# Patient Record
Sex: FEMALE | Race: WHITE
[De-identification: names, ages, dates, MRNs, and addresses within clinical notes are randomized per-mention and may not be internally consistent; named-entity substitution may affect disease eponyms.]

---

## 2020-12-14 ENCOUNTER — HOSPITAL ENCOUNTER (INPATIENT)
Dept: HOSPITAL 47 - 2ORMAIN | Age: 65
LOS: 8 days | Discharge: HOME | DRG: 331 | End: 2020-12-22
Attending: SURGERY | Admitting: SURGERY
Payer: COMMERCIAL

## 2020-12-14 VITALS — BODY MASS INDEX: 33.4 KG/M2

## 2020-12-14 DIAGNOSIS — K57.30: ICD-10-CM

## 2020-12-14 DIAGNOSIS — Z80.1: ICD-10-CM

## 2020-12-14 DIAGNOSIS — Z79.890: ICD-10-CM

## 2020-12-14 DIAGNOSIS — Z82.49: ICD-10-CM

## 2020-12-14 DIAGNOSIS — E89.0: ICD-10-CM

## 2020-12-14 DIAGNOSIS — R31.9: ICD-10-CM

## 2020-12-14 DIAGNOSIS — Z90.49: ICD-10-CM

## 2020-12-14 DIAGNOSIS — K56.699: Primary | ICD-10-CM

## 2020-12-14 DIAGNOSIS — Z79.899: ICD-10-CM

## 2020-12-14 DIAGNOSIS — J45.909: ICD-10-CM

## 2020-12-14 DIAGNOSIS — Z87.891: ICD-10-CM

## 2020-12-14 DIAGNOSIS — G43.909: ICD-10-CM

## 2020-12-14 PROCEDURE — 88307 TISSUE EXAM BY PATHOLOGIST: CPT

## 2020-12-14 PROCEDURE — 86901 BLOOD TYPING SEROLOGIC RH(D): CPT

## 2020-12-14 PROCEDURE — 80048 BASIC METABOLIC PNL TOTAL CA: CPT

## 2020-12-14 PROCEDURE — 80053 COMPREHEN METABOLIC PANEL: CPT

## 2020-12-14 PROCEDURE — 85025 COMPLETE CBC W/AUTO DIFF WBC: CPT

## 2020-12-14 PROCEDURE — 86850 RBC ANTIBODY SCREEN: CPT

## 2020-12-14 PROCEDURE — 80051 ELECTROLYTE PANEL: CPT

## 2020-12-14 PROCEDURE — 85027 COMPLETE CBC AUTOMATED: CPT

## 2020-12-14 PROCEDURE — 86900 BLOOD TYPING SEROLOGIC ABO: CPT

## 2020-12-14 PROCEDURE — 94640 AIRWAY INHALATION TREATMENT: CPT

## 2020-12-18 LAB
ANION GAP SERPL CALC-SCNC: 5 MMOL/L
CHLORIDE SERPL-SCNC: 104 MMOL/L (ref 98–107)
CO2 SERPL-SCNC: 28 MMOL/L (ref 22–30)
ERYTHROCYTE [DISTWIDTH] IN BLOOD BY AUTOMATED COUNT: 3.98 M/UL (ref 3.8–5.4)
ERYTHROCYTE [DISTWIDTH] IN BLOOD: 12.7 % (ref 11.5–15.5)
HCT VFR BLD AUTO: 36.8 % (ref 34–46)
HGB BLD-MCNC: 12.3 GM/DL (ref 11.4–16)
MCH RBC QN AUTO: 30.8 PG (ref 25–35)
MCHC RBC AUTO-ENTMCNC: 33.4 G/DL (ref 31–37)
MCV RBC AUTO: 92.3 FL (ref 80–100)
PLATELET # BLD AUTO: 348 K/UL (ref 150–450)
POTASSIUM SERPL-SCNC: 3.9 MMOL/L (ref 3.5–5.1)
SODIUM SERPL-SCNC: 137 MMOL/L (ref 137–145)
WBC # BLD AUTO: 9.6 K/UL (ref 3.8–10.6)

## 2020-12-18 PROCEDURE — 0DJD4ZZ INSPECTION OF LOWER INTESTINAL TRACT, PERCUTANEOUS ENDOSCOPIC APPROACH: ICD-10-PCS

## 2020-12-18 PROCEDURE — 0T778DZ DILATION OF LEFT URETER WITH INTRALUMINAL DEVICE, VIA NATURAL OR ARTIFICIAL OPENING ENDOSCOPIC: ICD-10-PCS

## 2020-12-18 PROCEDURE — 0DBN0ZZ EXCISION OF SIGMOID COLON, OPEN APPROACH: ICD-10-PCS

## 2020-12-18 PROCEDURE — 8E0W4CZ ROBOTIC ASSISTED PROCEDURE OF TRUNK REGION, PERCUTANEOUS ENDOSCOPIC APPROACH: ICD-10-PCS

## 2020-12-18 RX ADMIN — METRONIDAZOLE SCH MLS/HR: 500 INJECTION, SOLUTION INTRAVENOUS at 22:24

## 2020-12-18 RX ADMIN — POTASSIUM CHLORIDE SCH MLS: 14.9 INJECTION, SOLUTION INTRAVENOUS at 13:30

## 2020-12-18 RX ADMIN — HYDROMORPHONE HYDROCHLORIDE PRN MG: 1 INJECTION, SOLUTION INTRAMUSCULAR; INTRAVENOUS; SUBCUTANEOUS at 23:27

## 2020-12-18 RX ADMIN — DEXTROSE MONOHYDRATE, SODIUM CHLORIDE, AND POTASSIUM CHLORIDE SCH MLS/HR: 50; 4.5; 1.49 INJECTION, SOLUTION INTRAVENOUS at 22:24

## 2020-12-18 RX ADMIN — HEPARIN SODIUM SCH UNIT: 5000 INJECTION, SOLUTION INTRAVENOUS; SUBCUTANEOUS at 23:37

## 2020-12-18 NOTE — P.OP
Date of Procedure: 12/18/20


Preoperative Diagnosis: 


Sigmoid diverticular stricture


Postoperative Diagnosis: 


Same


Procedure(s) Performed: 


Cystoscopy with placement of 6-Swedish ureteral catheter left


Anesthesia: ROSE


Surgeon: Emir Nolen


Pathology: none sent


Condition: stable


Disposition: PACU


Indications for Procedure: 


The patient is 65.  She is in the operating suite see evening because of the 

need for a colectomy for severe sigmoid colonic diverticular stricture by Dr. Akins.  Due to the severe stricture Dr. Akins is uncertain as to the exact 

location of the ureter does not wish to injure it.  I've been asked to see the 

patient.  There is no urologic history that I can obtain from the chart.  The 

patient is asleep on the table.


Description of Procedure: 


The patient is been previously anesthetized is on the operating table.  When I 

arrived she is oriented in lithotomy position with cystoscopy.  I pulled the 

Ospina catheter.  I passed the 21-Swedish cystoscope into the urethra is normal.  

The bladder walls grossly unremarkable.  The left ureteral orifice is easily 

identified.  An 035 wires passed up the ureter into the region of the kidney.  

Dr. Akins feels the wire go through the ureter.  I then over the wire pass a 6-

Swedish open-ended ureteral catheter with ease up.  Dr. Akins feels the catheter 

go up the ureter.  The cystoscope was removed.  The ureteral catheter secured to

the 16-Swedish Ospina catheter placed in the bladder.





Impression:no obvious injury of the ureter or bladder during this difficult 

surgical procedure.  The ureteral catheter can be removed when it is deemed 

appropriate by Dr. Akins.  No further urologic care should be required.

## 2020-12-18 NOTE — P.OP
Date of Procedure: 12/18/20


Preoperative Diagnosis: 


Diverticular stricture


Postoperative Diagnosis: 


Diverticular stricture with dense pelvic adhesions


Procedure(s) Performed: 


Attempted robotic sigmoid resection converted to open sigmoid resection


Anesthesia: ROSE


Surgeon: Brittney Akins


Estimated Blood Loss (ml): 600


Pathology: other


Condition: stable


Disposition: PACU


Indications for Procedure: 


The patient presented with a diverticular stricture


Description of Procedure: 


The patient's taken to the operative suite where she is prepped and draped in 

the usual sterile manner under general endotracheal anesthetic.  The abdomen was

entered through with an optical trocar in the right mid abdomen.  

Pneumoperitoneum was established with CO2 gas.  Sites are chosen for accessory 

trochars and these are laced through small skin incisions.  The patient is 

placed into Trendelenburg position.  The robot is then docked.  She has some 

loops of small bowel adhered to the bladder and uterus.  These are sharply taken

down.  The sigmoid colon is also densely adherent to the uterus and right tube 

and ovary.  This tissue was attempted to be freed up.  It was fairly dense a 

decision was made to start in the sigmoid colon more proximally and worked 

distally.  An opening was made in the mesentery in the APPLE stapler was placed 

and fired.  The mesentery was taken down with harmonic scissors.  Attempted to 

dissect the sigmoid off the tube and ovary and bleeding was encountered in the 

mesentery of the tube.  It could not easily be controlled with the LigaSure type

device.  The sigmoid colon was also so stuck it wasn't going to be able to be 

dissected free robotically.  Therefore she was undocked from the robot.  The 

abdomen was entered through a Pfannenstiel incision.  A Bookwalter retractor was

placed.  The small bowel was dissected free and packed out of the way in the 

upper abdomen.  Then tediously the sigmoid colon is dissected free from the 

uterus, tube and ovary and pelvic sidewall.  Dissection was carried out through 

the more distal portion of the sigmoid and off the back wall of the uterus.  

Dissection was carried out laterally.  The ureter could not be definitively 

identified.  Tediously the mesentery was dissected free.  The sigmoidal vessels 

were clamped, cut and tied with 0 Vicryl suture.  Dissection was then able to be

carried out distally and a TA 60 was placed and fired.  The specimen was passed 

off.  The urologist, Dr. Nolen was consult intraoperatively and was kind enough 

to come in and place a stent in the right ureter.  The ureter appeared intact 

and the stent was easily palpable.  The proximal descending colon was then 

cleaned up of some mesentery and epiploic fat.  A pursestring suture was placed 

and fired.  The bowel size down nicely to 60.  The anvil was placed into the 

proximal portion and the pursestring was tied down.  Cystoscopy went down below 

and passed sizers and then the stapler.  The attachment prong was advanced until

the orange portion was seen.  It was then attached to the anvil and closed down 

until the indicator was in the mid green portion.  It was held for a minute.  

Fired and held for minute.  It was then derotated and removed.  Sterile saline 

was placed into the pelvis.  The proximal colon was manually compressed and the 

distal segment of the bowel was insufflated with air multiple times showing an 

airtight anastomosis.  The irrigant was suctioned out.  A drain was placed in 

the pelvis and brought out through a trocar site.  The fascia at the larger 

trocar sites was closed with 0 Vicryl.  The skin incisions were closed with 

staples.  The peritoneum was closed with 0 Vicryl.  The anterior fascia was 

closed with PDS.  Pfannenstiel incision was closed with staples.  Sterile 

dressings were applied.  She tolerated the procedure without difficulty and was 

taken recovery room in satisfactory condition.  According to or personnel, all 

counts were correct.

## 2020-12-19 LAB
ANION GAP SERPL CALC-SCNC: 6.9 MMOL/L (ref 4–12)
BASOPHILS # BLD AUTO: 0 K/UL (ref 0–0.2)
BASOPHILS NFR BLD AUTO: 0 %
BUN SERPL-SCNC: 12 MG/DL (ref 9–27)
BUN/CREAT SERPL: 15 RATIO (ref 12–20)
CALCIUM SPEC-MCNC: 8.4 MG/DL (ref 8.7–10.3)
CHLORIDE SERPL-SCNC: 105 MMOL/L (ref 96–109)
CO2 SERPL-SCNC: 26.1 MMOL/L (ref 21.6–31.8)
EOSINOPHIL # BLD AUTO: 0.1 K/UL (ref 0–0.7)
EOSINOPHIL NFR BLD AUTO: 0 %
ERYTHROCYTE [DISTWIDTH] IN BLOOD BY AUTOMATED COUNT: 3.23 M/UL (ref 3.8–5.4)
ERYTHROCYTE [DISTWIDTH] IN BLOOD: 12.3 % (ref 11.5–15.5)
GLUCOSE SERPL-MCNC: 134 MG/DL (ref 70–110)
HCT VFR BLD AUTO: 29.7 % (ref 34–46)
HGB BLD-MCNC: 10.1 GM/DL (ref 11.4–16)
LYMPHOCYTES # SPEC AUTO: 1 K/UL (ref 1–4.8)
LYMPHOCYTES NFR SPEC AUTO: 7 %
MCH RBC QN AUTO: 31.1 PG (ref 25–35)
MCHC RBC AUTO-ENTMCNC: 33.9 G/DL (ref 31–37)
MCV RBC AUTO: 91.7 FL (ref 80–100)
MONOCYTES # BLD AUTO: 0.6 K/UL (ref 0–1)
MONOCYTES NFR BLD AUTO: 4 %
NEUTROPHILS # BLD AUTO: 11.6 K/UL (ref 1.3–7.7)
NEUTROPHILS NFR BLD AUTO: 87 %
PLATELET # BLD AUTO: 305 K/UL (ref 150–450)
POTASSIUM SERPL-SCNC: 4.9 MMOL/L (ref 3.5–5.5)
SODIUM SERPL-SCNC: 138 MMOL/L (ref 135–145)
WBC # BLD AUTO: 13.3 K/UL (ref 3.8–10.6)

## 2020-12-19 RX ADMIN — KETOROLAC TROMETHAMINE SCH MG: 15 INJECTION, SOLUTION INTRAMUSCULAR; INTRAVENOUS at 16:58

## 2020-12-19 RX ADMIN — FLUTICASONE PROPIONATE SCH PUFF: 110 AEROSOL, METERED RESPIRATORY (INHALATION) at 21:10

## 2020-12-19 RX ADMIN — HEPARIN SODIUM SCH UNIT: 5000 INJECTION, SOLUTION INTRAVENOUS; SUBCUTANEOUS at 07:35

## 2020-12-19 RX ADMIN — POTASSIUM CHLORIDE SCH: 14.9 INJECTION, SOLUTION INTRAVENOUS at 05:06

## 2020-12-19 RX ADMIN — DEXTROSE MONOHYDRATE, SODIUM CHLORIDE, AND POTASSIUM CHLORIDE SCH MLS/HR: 50; 4.5; 1.49 INJECTION, SOLUTION INTRAVENOUS at 07:35

## 2020-12-19 RX ADMIN — PANTOPRAZOLE SODIUM SCH MG: 40 INJECTION, POWDER, FOR SOLUTION INTRAVENOUS at 07:35

## 2020-12-19 RX ADMIN — HEPARIN SODIUM SCH UNIT: 5000 INJECTION, SOLUTION INTRAVENOUS; SUBCUTANEOUS at 23:52

## 2020-12-19 RX ADMIN — ONDANSETRON PRN MG: 2 INJECTION INTRAMUSCULAR; INTRAVENOUS at 14:33

## 2020-12-19 RX ADMIN — DEXTROSE MONOHYDRATE, SODIUM CHLORIDE, AND POTASSIUM CHLORIDE SCH MLS/HR: 50; 4.5; 1.49 INJECTION, SOLUTION INTRAVENOUS at 21:08

## 2020-12-19 RX ADMIN — METOCLOPRAMIDE SCH MG: 5 INJECTION, SOLUTION INTRAMUSCULAR; INTRAVENOUS at 23:52

## 2020-12-19 RX ADMIN — HYDROMORPHONE HYDROCHLORIDE PRN MG: 1 INJECTION, SOLUTION INTRAMUSCULAR; INTRAVENOUS; SUBCUTANEOUS at 14:33

## 2020-12-19 RX ADMIN — ONDANSETRON PRN MG: 2 INJECTION INTRAMUSCULAR; INTRAVENOUS at 07:53

## 2020-12-19 RX ADMIN — KETOROLAC TROMETHAMINE SCH MG: 15 INJECTION, SOLUTION INTRAMUSCULAR; INTRAVENOUS at 23:52

## 2020-12-19 RX ADMIN — METOCLOPRAMIDE SCH MG: 5 INJECTION, SOLUTION INTRAMUSCULAR; INTRAVENOUS at 16:58

## 2020-12-19 RX ADMIN — METRONIDAZOLE SCH MLS/HR: 500 INJECTION, SOLUTION INTRAVENOUS at 05:11

## 2020-12-19 RX ADMIN — HEPARIN SODIUM SCH UNIT: 5000 INJECTION, SOLUTION INTRAVENOUS; SUBCUTANEOUS at 16:59

## 2020-12-19 RX ADMIN — HYDROMORPHONE HYDROCHLORIDE PRN MG: 1 INJECTION, SOLUTION INTRAMUSCULAR; INTRAVENOUS; SUBCUTANEOUS at 06:51

## 2020-12-19 NOTE — P.PN
Subjective


Progress Note Date: 12/19/20


The patient is Postoperative day 1 sigmoid resection for diverticular stricture.

 She had a good night.  She has a little nausea this morning.  No chest pain or 

shortness of breath.








Objective





- Vital Signs


Vital signs: 


                                   Vital Signs











Temp  99.0 F   12/19/20 08:00


 


Pulse  86   12/19/20 08:00


 


Resp  16   12/19/20 08:00


 


BP  130/80   12/19/20 08:00


 


Pulse Ox  90 L  12/19/20 08:00








                                 Intake & Output











 12/18/20 12/19/20 12/19/20





 18:59 06:59 18:59


 


Intake Total 2950 800 


 


Output Total 700 710 


 


Balance 2250 90 


 


Weight 77 kg 77 kg 


 


Intake:   


 


  IV 2950 700 


 


  Oral  100 


 


Output:   


 


  Drainage  60 


 


    Abdomen  60 


 


  Urine 200 650 


 


    Uretheral (Ospina)  200 


 


  Estimated Blood Loss 500  


 


Other:   


 


  Voiding Method  Indwelling Catheter 














- Constitutional


General appearance: Present: cooperative, no acute distress





- Respiratory


Respiratory: bilateral: CTA, diminished (mildly bilaterally), negative: wheezing





- Cardiovascular


Rhythm: regular





- Gastrointestinal


General gastrointestinal: Present: decreased bowel sounds, soft


Localized gastrointestinal: surgical scar: diffuse (Dressings are intact, clean 

and dry)





- Labs


CBC & Chem 7: 


                                 12/19/20 06:26





                                 12/19/20 06:26


Labs: 


                  Abnormal Lab Results - Last 24 Hours (Table)











  12/19/20 12/19/20 Range/Units





  06:26 06:26 


 


WBC  13.3 H   (3.8-10.6)  k/uL


 


RBC  3.23 L   (3.80-5.40)  m/uL


 


Hgb  10.1 L   (11.4-16.0)  gm/dL


 


Hct  29.7 L   (34.0-46.0)  %


 


Neutrophils #  11.6 H   (1.3-7.7)  k/uL


 


Glucose   134 H  ()  mg/dL


 


Calcium   8.4 L  (8.7-10.3)  mg/dL














Assessment and Plan


(1) Diverticular stricture


Current Visit: Yes   Status: Acute   Code(s): K56.699 - OTHER INTESTNL OBST UNSP

AS TO PARTIAL VERSUS COMPLETE OBST   SNOMED Code(s): 96117097


   





(2) Asthma


Current Visit: Yes   Status: Acute   Code(s): J45.909 - UNSPECIFIED ASTHMA, 

UNCOMPLICATED   SNOMED Code(s): 998603502


   


Plan: 


Diverticular stricture we will add Toradol for additional pain control.  Add 

Reglan for some nausea.  Bowel sounds are fairly good today.  Encourage 

incentive spirometry.  Encourage ambulation.  DVT and ulcer prophylaxis.  

Progressing slowly.  I will leave the catheter in today since there is little 

blood in the urine.

## 2020-12-19 NOTE — P.CONS
History of Present Illness





- Reason for Consult


Consult date: 12/19/20


Medical management


Requesting physician: Brittney Akins





- Chief Complaint


Sigmoid resection for diverticular structure, asthma, hypothyroidism and re





- History of Present Illness





64-year-old female with past medical history of asthma, hypothyroidism, migraine

and recurrent diverticulitis who developed to have significant pain and 

discomfort with sign and symptom of diverticulitis was treated and watch she was

seen Dr. Prince back in August colonoscopy was done apparently showed severe 

diverticulitis patient ended up being sent for CT of the abdomen and pelvis came

back with abscess from ruptured diverticuli.  Patient was sent to the point to 

[facility] and spent 5 days on IV antibiotics and sent home on oral 

antibiotic for long time.  Patient was seen Dr. Prince for follow-up for to Dr. Akins continue to have sign and symptom of slight abdominal discomfort the left 

side patient ended up coming to yesterday for elective surgery which might start

as a robotic for partial resection of the sigmoid ended up having open resection

of the sigmoid with partial removal of diverticular structure.  Patient done 

very well in recovery ended up being sent to the floor has been feeling much 

better today still nothing by mouth but no NG tube at this point.





Review of Systems





CONSTITUTIONAL: Well-developed no acute respiratory distress.


EYES: No icterus sclerae, no conjunctivitis.


EARS, NOSE, MOUTH, THROAT, and FACE: No sore throat, lymphadenopathy, carotid 

bruits or deformity.


RESPIRATORY: No SOB cough or wheezes.  History of asthma with no wheezes


CARDIOVASCULAR: No CP, Palpitation, PND, Orthopnea, or angina.


GASTROINTESTINAL: Significant abdominal pain from her surgical side still have 

drainage tube in with no bleeding.


GENITOURINARY: Negative for Hematuria or UTI, no kidney stones.


INTEGUMENT/BREAST: Negative for any muscular injury with mild osteoarthritis..


HEMATOLOGIC/LYMPHATIC: Negative for bleed or purpura.


MUSCULOSKELTAL: Negative for Myalgia or arthralgia.


NEURLOGICAL: History of migraine with no neural loss.


BEHAVIORAL/PSYCH: Negative.


ENDOCRINE: Negative.





Past Medical History


Past Medical History: Asthma, Thyroid Disorder


Additional Past Medical History / Comment(s): migranes       diverticulitis


History of Any Multi-Drug Resistant Organisms: None Reported


Past Surgical History: Appendectomy


Additional Past Surgical History / Comment(s): thyroid left hemisphere removed, 

colonoscopy


Past Anesthesia/Blood Transfusion Reactions: Postoperative Nausea & Vomiting 

(PONV)


Past Psychological History: No Psychological Hx Reported


Smoking Status: Former smoker


Past Alcohol Use History: Occasional


Past Drug Use History: None Reported





- Past Family History


  ** Father


Family Medical History: Cancer


Additional Family Medical History / Comment(s): lung





Medications and Allergies


                                Home Medications











 Medication  Instructions  Recorded  Confirmed  Type


 


Levothyroxine Sodium [Synthroid] 25 mcg PO DAILY 12/16/20 12/16/20 History


 


Mometasone Inhalr 220 Mcg/Puff 1 puff INHALATION DAILY 12/16/20 12/16/20 History





[Asmanex]    


 


SUMAtriptan SUCCINATE [Imitrex] 100 mg PO BID PRN 12/16/20 12/16/20 History


 


Salmeterol 50 mcg [Serevent Diskus] 1 puff INHALATION ONCE 12/16/20 12/16/20 

History


 


Albuterol Sulfate [Proair Hfa] PRN 12/18/20  History








                                    Allergies











Allergy/AdvReac Type Severity Reaction Status Date / Time


 


No Known Allergies Allergy   Verified 12/18/20 13:36














Physical Exam


Vitals: 


                                   Vital Signs











  Temp Pulse Pulse Pulse Resp BP BP


 


 12/19/20 08:00  99.0 F  86    16   130/80


 


 12/19/20 02:00  98.4 F  92    20   116/76


 


 12/18/20 21:58     85   110/73 


 


 12/18/20 21:43     90   106/74 


 


 12/18/20 21:28     93   112/77 


 


 12/18/20 21:13     85   109/74 


 


 12/18/20 20:58     88   109/74 


 


 12/18/20 20:43     86   102/70 


 


 12/18/20 20:29     75   114/65 


 


 12/18/20 20:13     91   109/77 


 


 12/18/20 19:58     82   108/73 


 


 12/18/20 19:43  97.7 F    72  15  121/71 


 


 12/18/20 19:30      16  


 


 12/18/20 19:00     76  16  123/64 


 


 12/18/20 18:45     80  16  146/74 


 


 12/18/20 18:30     60  16  128/62 


 


 12/18/20 18:15     69  16  136/88 


 


 12/18/20 18:04  97.9 F    62  14  154/64 


 


 12/18/20 13:28  97.8 F   83   16  168/74 














  Pulse Ox


 


 12/19/20 08:00  90 L


 


 12/19/20 02:00  93 L


 


 12/18/20 21:58  93 L


 


 12/18/20 21:43  92 L


 


 12/18/20 21:28  91 L


 


 12/18/20 21:13  91 L


 


 12/18/20 20:58  91 L


 


 12/18/20 20:43  94 L


 


 12/18/20 20:29  94 L


 


 12/18/20 20:13  90 L


 


 12/18/20 19:58 


 


 12/18/20 19:43  93 L


 


 12/18/20 19:30 


 


 12/18/20 19:00  96


 


 12/18/20 18:45  96


 


 12/18/20 18:30  100


 


 12/18/20 18:15  100


 


 12/18/20 18:04  100


 


 12/18/20 13:28  99








                                Intake and Output











 12/18/20 12/19/20 12/19/20





 22:59 06:59 14:59


 


Intake Total 2500 100 


 


Output Total 750 660 


 


Balance 1750 -560 


 


Intake:   


 


  IV 2500  


 


  Oral  100 


 


Output:   


 


  Drainage  60 


 


    Abdomen  60 


 


  Urine 250 600 


 


    Uretheral (Ospina)  200 


 


  Estimated Blood Loss 500  


 


Other:   


 


  Voiding Method Indwelling Catheter  


 


  Weight 77 kg  








General Appearance: Alert, cooperative, no distress, appears stated age.


Neck HEENT: Supple, no lymphadenopathy, no thyroid enlargement, no carotid 

bruits.


Lungs: Clear to auscultation without crackles or wheezes no rhonchi, no 

deformity.


Chest Wall: Chest wall normal expansion with deep inspiration no tenderness and 

no deformity was found on exam, no costochondral pain or discomfort.


Heart: Regular rate and rhythm, S1, S2 normal, no murmur, rub or gallop.


Back: Symmetric, no curvature, ROM normal, no CVA tenderness.


Abdomen: Incision has lower abdominal surgical site with no sign of bleeding, 

right side has slight drainage tube and there is an incision smaller on the left

side as well with no bleed.


Extremities: Extremities normal, atraumatic, no cyanosis or edema.


Pulses: 2+ and symmetric.


Skin: Skin color, texture, tugor normal, no rashes or lesions.


Neurologic: Alert oriented x3 cranial nerves II through XII intact, no motor 

deficit, no abnormal balance or gait.





Results


CBC & Chem 7: 


                                 12/19/20 06:26





                                 12/19/20 06:26


Labs: 


                  Abnormal Lab Results - Last 24 Hours (Table)











  12/19/20 12/19/20 Range/Units





  06:26 06:26 


 


WBC  13.3 H   (3.8-10.6)  k/uL


 


RBC  3.23 L   (3.80-5.40)  m/uL


 


Hgb  10.1 L   (11.4-16.0)  gm/dL


 


Hct  29.7 L   (34.0-46.0)  %


 


Neutrophils #  11.6 H   (1.3-7.7)  k/uL


 


Glucose   134 H  ()  mg/dL


 


Calcium   8.4 L  (8.7-10.3)  mg/dL














Assessment and Plan


Assessment: 





1 day 2 post sigmoid resection: Patient is doing well no nausea vomiting no NG 

tube at this point pain is well control patient Vicodin hemodynamic status very 

carefully watch has been stable.





2 severe diverticulitis with recurrent episode and found of diverticular st

ructure post surgery doing well.





3 post acute diverticulitis with rupture and abdominal abscess post IV 

antibiotic for several weeks has done very well so far.





4 asthma: Patient will go back on her Serevent and albuterol inhaler.





5 hypothyroidism: Resume levothyroxine at 25 g daily.





6 history of migraine: With no flareup at this point continue Imitrex as needed.





7 DVT prophylaxis: Continue patient on heparin 5000 units subcu every 8 hours.





8 GI prophylaxis: Patient remain on pantoprazole 40 mg IV daily.





CODE STATUS: Full code.





Dr. Akins thank you very much for the consult if I can be any further help to 

please let me know.

## 2020-12-20 RX ADMIN — METOCLOPRAMIDE SCH MG: 5 INJECTION, SOLUTION INTRAMUSCULAR; INTRAVENOUS at 11:42

## 2020-12-20 RX ADMIN — FLUTICASONE PROPIONATE SCH PUFF: 110 AEROSOL, METERED RESPIRATORY (INHALATION) at 19:39

## 2020-12-20 RX ADMIN — HEPARIN SODIUM SCH UNIT: 5000 INJECTION, SOLUTION INTRAVENOUS; SUBCUTANEOUS at 16:55

## 2020-12-20 RX ADMIN — DEXTROSE MONOHYDRATE, SODIUM CHLORIDE, AND POTASSIUM CHLORIDE SCH MLS/HR: 50; 4.5; 1.49 INJECTION, SOLUTION INTRAVENOUS at 07:00

## 2020-12-20 RX ADMIN — POTASSIUM CHLORIDE SCH: 14.9 INJECTION, SOLUTION INTRAVENOUS at 04:57

## 2020-12-20 RX ADMIN — PANTOPRAZOLE SODIUM SCH MG: 40 INJECTION, POWDER, FOR SOLUTION INTRAVENOUS at 07:00

## 2020-12-20 RX ADMIN — FLUTICASONE PROPIONATE SCH PUFF: 110 AEROSOL, METERED RESPIRATORY (INHALATION) at 09:45

## 2020-12-20 RX ADMIN — HEPARIN SODIUM SCH UNIT: 5000 INJECTION, SOLUTION INTRAVENOUS; SUBCUTANEOUS at 07:00

## 2020-12-20 RX ADMIN — METOCLOPRAMIDE SCH MG: 5 INJECTION, SOLUTION INTRAMUSCULAR; INTRAVENOUS at 16:56

## 2020-12-20 RX ADMIN — LEVOTHYROXINE SODIUM SCH MCG: 25 TABLET ORAL at 05:45

## 2020-12-20 RX ADMIN — METOCLOPRAMIDE SCH MG: 5 INJECTION, SOLUTION INTRAMUSCULAR; INTRAVENOUS at 05:44

## 2020-12-20 RX ADMIN — KETOROLAC TROMETHAMINE SCH MG: 15 INJECTION, SOLUTION INTRAMUSCULAR; INTRAVENOUS at 05:44

## 2020-12-20 RX ADMIN — KETOROLAC TROMETHAMINE SCH MG: 15 INJECTION, SOLUTION INTRAMUSCULAR; INTRAVENOUS at 16:56

## 2020-12-20 RX ADMIN — KETOROLAC TROMETHAMINE SCH MG: 15 INJECTION, SOLUTION INTRAMUSCULAR; INTRAVENOUS at 11:43

## 2020-12-20 NOTE — P.PN
Subjective


Progress Note Date: 12/20/20


Principal diagnosis: 


Diverticular stricture





\\The patient is postop day 2 sigmoid resection for diverticular stricture.  She

is doing well.  No nausea or vomiting.  Pain is better controlled today.  Still 

not doing very well with her incentive spirometer.








Objective





- Vital Signs


Vital signs: 


                                   Vital Signs











Temp  98.1 F   12/20/20 07:39


 


Pulse  80   12/20/20 07:39


 


Resp  18   12/20/20 07:39


 


BP  124/71   12/20/20 07:39


 


Pulse Ox  91 L  12/20/20 07:39








                                 Intake & Output











 12/19/20 12/20/20 12/20/20





 18:59 06:59 18:59


 


Output Total 840 480 


 


Balance -840 -480 


 


Output:   


 


  Drainage 40 30 


 


    Abdomen 40 30 


 


  Urine 800 450 


 


Other:   


 


  Voiding Method  Indwelling Catheter Indwelling Catheter














- Constitutional


General appearance: Present: cooperative, no acute distress





- Respiratory


Respiratory: bilateral: CTA, diminished (at the bases)





- Cardiovascular


Rhythm: regular





- Gastrointestinal


General gastrointestinal: Present: decreased bowel sounds, soft


Localized gastrointestinal: surgical scar: diffuse (incisions intact, clean and 

dry.  VINCENZO serous.  Urine clear)





- Labs


CBC & Chem 7: 


                                 12/19/20 06:26





                                 12/19/20 06:26





Assessment and Plan


(1) Diverticular stricture


Current Visit: Yes   Status: Acute   Code(s): K56.699 - OTHER INTESTNL OBST UNSP

AS TO PARTIAL VERSUS COMPLETE OBST   SNOMED Code(s): 40473737


   





(2) Asthma


Current Visit: Yes   Status: Acute   Code(s): J45.909 - UNSPECIFIED ASTHMA, 

UNCOMPLICATED   SNOMED Code(s): 180722592


   


Plan: 


The urine looks clear today.  The Ospina and stent will be removed.  This was 

discussed with the nurse.  VINCENZO will be removed.  Will increase her diet and 

activity as tolerated.  Encouraged incentive spirometry.  She is progressing 

slowly.

## 2020-12-21 RX ADMIN — LEVOTHYROXINE SODIUM SCH MCG: 25 TABLET ORAL at 06:04

## 2020-12-21 RX ADMIN — POTASSIUM CHLORIDE SCH: 14.9 INJECTION, SOLUTION INTRAVENOUS at 04:43

## 2020-12-21 RX ADMIN — METOCLOPRAMIDE SCH MG: 5 INJECTION, SOLUTION INTRAMUSCULAR; INTRAVENOUS at 12:33

## 2020-12-21 RX ADMIN — METOCLOPRAMIDE SCH MG: 5 INJECTION, SOLUTION INTRAMUSCULAR; INTRAVENOUS at 06:04

## 2020-12-21 RX ADMIN — DEXTROSE MONOHYDRATE, SODIUM CHLORIDE, AND POTASSIUM CHLORIDE SCH MLS/HR: 50; 4.5; 1.49 INJECTION, SOLUTION INTRAVENOUS at 01:57

## 2020-12-21 RX ADMIN — HEPARIN SODIUM SCH UNIT: 5000 INJECTION, SOLUTION INTRAVENOUS; SUBCUTANEOUS at 16:40

## 2020-12-21 RX ADMIN — HEPARIN SODIUM SCH UNIT: 5000 INJECTION, SOLUTION INTRAVENOUS; SUBCUTANEOUS at 07:03

## 2020-12-21 RX ADMIN — PANTOPRAZOLE SODIUM SCH MG: 40 INJECTION, POWDER, FOR SOLUTION INTRAVENOUS at 07:03

## 2020-12-21 RX ADMIN — DEXTROSE MONOHYDRATE, SODIUM CHLORIDE, AND POTASSIUM CHLORIDE SCH MLS/HR: 50; 4.5; 1.49 INJECTION, SOLUTION INTRAVENOUS at 14:57

## 2020-12-21 RX ADMIN — FLUTICASONE PROPIONATE SCH PUFF: 110 AEROSOL, METERED RESPIRATORY (INHALATION) at 07:55

## 2020-12-21 RX ADMIN — FLUTICASONE PROPIONATE SCH PUFF: 110 AEROSOL, METERED RESPIRATORY (INHALATION) at 19:44

## 2020-12-21 RX ADMIN — METOCLOPRAMIDE SCH MG: 5 INJECTION, SOLUTION INTRAMUSCULAR; INTRAVENOUS at 01:56

## 2020-12-21 RX ADMIN — KETOROLAC TROMETHAMINE SCH MG: 15 INJECTION, SOLUTION INTRAMUSCULAR; INTRAVENOUS at 06:04

## 2020-12-21 RX ADMIN — KETOROLAC TROMETHAMINE SCH MG: 15 INJECTION, SOLUTION INTRAMUSCULAR; INTRAVENOUS at 01:56

## 2020-12-21 RX ADMIN — HEPARIN SODIUM SCH UNIT: 5000 INJECTION, SOLUTION INTRAVENOUS; SUBCUTANEOUS at 01:56

## 2020-12-21 RX ADMIN — KETOROLAC TROMETHAMINE SCH MG: 15 INJECTION, SOLUTION INTRAMUSCULAR; INTRAVENOUS at 12:34

## 2020-12-21 RX ADMIN — HEPARIN SODIUM SCH UNIT: 5000 INJECTION, SOLUTION INTRAVENOUS; SUBCUTANEOUS at 23:03

## 2020-12-21 NOTE — P.PN
Subjective





64-year-old female with past medical history of asthma, hypothyroidism, migraine

and recurrent diverticulitis who developed to have significant pain and 

discomfort with sign and symptom of diverticulitis was treated and watch she was

seen Dr. Prince back in August colonoscopy was done apparently showed severe 

diverticulitis patient ended up being sent for CT of the abdomen and pelvis came

back with abscess from ruptured diverticuli.  Patient was sent to the point to 

[facility] and spent 5 days on IV antibiotics and sent home on oral anti

biotic for long time.  Patient was seen Dr. Prince for follow-up for to Dr. Akins 

continue to have sign and symptom of slight abdominal discomfort the left side 

patient ended up coming to yesterday for elective surgery which might start as a

robotic for partial resection of the sigmoid ended up having open resection of 

the sigmoid with partial removal of diverticular structure.  Patient done very 

well in recovery ended up being sent to the floor has been feeling much better 

today still nothing by mouth but no NG tube at this point.





12/20: Patient is doing very well pain is under control no bleeding from the 

surgical site patient is doing well she is still nothing by mouth but no NG tube

at this point patient still better rested at this point which will increase 

physical therapy and titrate activity slightly.





12/22: Patient evaluated this morning, she is doing well, states pain is under c

ontrol.  Noted to have slight bleeding from the lower abdominal surgical site on

the left side.  Patient is tolerating clear liquids, reports she is passing some

flatus.  She denies any nausea or vomiting.  Encouraged to use incentive 

spirometer and ambulation.





Objective





- Vital Signs


Vital signs: 


                                   Vital Signs











Temp  99.2 F   12/21/20 00:53


 


Pulse  90   12/21/20 00:53


 


Resp  18   12/21/20 00:53


 


BP  151/74   12/21/20 00:53


 


Pulse Ox  98   12/21/20 00:53








                                 Intake & Output











 12/20/20 12/21/20 12/21/20





 18:59 06:59 18:59


 


Output Total 1201 20 


 


Balance -1201 -20 


 


Output:   


 


  Drainage  20 


 


    Abdomen  20 


 


  Urine 1201  


 


    Uretheral (Ospina) 1200  


 


Other:   


 


  Voiding Method Indwelling Catheter Toilet 


 


  # Voids 1 3 














- Exam





General Appearance: Alert, cooperative, no distress, appears stated age.


Neck HEENT: Supple, no lymphadenopathy, no thyroid enlargement, no carotid 

bruits.


Lungs: Clear to auscultation without crackles or wheezes no rhonchi, no 

deformity.


Chest Wall: Chest wall normal expansion with deep inspiration no tenderness and 

no deformity was found on exam, no costochondral pain or discomfort.


Heart: Regular rate and rhythm, S1, S2 normal, no murmur, rub or gallop.


Back: Symmetric, no curvature, ROM normal, no CVA tenderness.


Abdomen: Incision has lower abdominal surgical site with mild bleeding to the 

left side, right side has slight drainage tube and there is an incision smaller 

on the left side .


Extremities: Extremities normal, atraumatic, no cyanosis or edema.


Pulses: 2+ and symmetric.


Skin: Skin color, texture, tugor normal, no rashes or lesions.


Neurologic: Alert oriented x3 cranial nerves II through XII intact, no motor 

deficit, no abnormal balance or gait.








- Labs


CBC & Chem 7: 


                                 12/19/20 06:26





                                 12/19/20 06:26





Assessment and Plan


Plan: 





1 day 3 post sigmoid resection: Patient is doing well no nausea vomiting, no NG 

tube at this point, pain is well controlled, patient is very stable 

hemodynamically 





2 severe diverticulitis with recurrent episode and found of diverticular 

structure post surgery doing well.





3 post acute diverticulitis with rupture and abdominal abscess post IV 

antibiotic for several weeks has done very well so far.





4 asthma: Patient will go back on her Serevent and albuterol inhaler.





5 hypothyroidism: Resume levothyroxine at 25 g daily.





6 history of migraine: With no flareup at this point continue Imitrex as needed.





7 DVT prophylaxis: Continue patient on heparin 5000 units subcu every 8 hours.





8 GI prophylaxis: Patient remain on pantoprazole 40 mg IV daily.





Will increase physical activity continue local care for the incision continue 

pain management patient probably will be able to be discharged sometimes on 

Tuesday.








The above impression and plan of care have been discussed and directed by 

signing physician. Adela Shafer nurse practitioner acting as scribe for margarita dawn physician.

## 2020-12-21 NOTE — P.PN
Subjective


Progress Note Date: 12/20/20


Principal diagnosis: 





Sigmoid resection for diverticular structure, asthma, hypothyroidism and re











64-year-old female with past medical history of asthma, hypothyroidism, migraine

and recurrent diverticulitis who developed to have significant pain and 

discomfort with sign and symptom of diverticulitis was treated and watch she was

seen Dr. Prince back in August colonoscopy was done apparently showed severe 

diverticulitis patient ended up being sent for CT of the abdomen and pelvis came

back with abscess from ruptured diverticuli.  Patient was sent to the point to 

[facility] and spent 5 days on IV antibiotics and sent home on oral 

antibiotic for long time.  Patient was seen Dr. Prince for follow-up for to Dr. Akins continue to have sign and symptom of slight abdominal discomfort the left 

side patient ended up coming to yesterday for elective surgery which might start

as a robotic for partial resection of the sigmoid ended up having open resection

of the sigmoid with partial removal of diverticular structure.  Patient done 

very well in recovery ended up being sent to the floor has been feeling much 

better today still nothing by mouth but no NG tube at this point.





12/20: Patient is doing very well pain is under control no bleeding from the 

surgical site patient is doing well she is still nothing by mouth but no NG tube

at this point patient still better rested at this point which will increase 

physical therapy and titrate activity slightly.





Objective





- Vital Signs


Vital signs: 


                                   Vital Signs











Temp  99.2 F   12/20/20 01:44


 


Pulse  91   12/20/20 01:44


 


Resp  16   12/20/20 01:44


 


BP  123/69   12/20/20 01:44


 


Pulse Ox  91 L  12/20/20 01:44








                                 Intake & Output











 12/19/20 12/19/20 12/20/20





 06:59 18:59 06:59


 


Intake Total 800  


 


Output Total 710 840 450


 


Balance 90 -840 -450


 


Weight 77 kg  


 


Intake:   


 


    


 


  Oral 100  


 


Output:   


 


  Drainage 60 40 


 


    Abdomen 60 40 


 


  Urine 650 800 450


 


    Uretheral ([place]) 200  


 


Other:   


 


  Voiding Method Indwelling Catheter  Indwelling Catheter














- Exam








 Review of Systems 





CONSTITUTIONAL: Well-developed no acute respiratory distress.


EYES: No icterus sclerae, no conjunctivitis.


EARS, NOSE, MOUTH, THROAT, and FACE: No sore throat, lymphadenopathy, carotid 

bruits or deformity.


RESPIRATORY: No SOB cough or wheezes.  History of asthma with no wheezes


CARDIOVASCULAR: No CP, Palpitation, PND, Orthopnea, or angina.


GASTROINTESTINAL: Significant abdominal pain from her surgical side still have 

drainage tube in with no bleeding.


GENITOURINARY: Negative for Hematuria or UTI, no kidney stones.


INTEGUMENT/BREAST: Negative for any muscular injury with mild osteoarthritis..


HEMATOLOGIC/LYMPHATIC: Negative for bleed or purpura.


MUSCULOSKELTAL: Negative for Myalgia or arthralgia.


NEURLOGICAL: History of migraine with no neural loss.


BEHAVIORAL/PSYCH: Negative.


ENDOCRINE: Negative.





 Medications and Allergies 


                                Home Medications











 Medication  Instructions  Recorded  Confirmed  Type


 


Levothyroxine Sodium [Synthroid] 25 mcg PO DAILY 12/16/20 12/16/20 History


 


Mometasone Inhalr 220 Mcg/Puff 1 puff INHALATION DAILY 12/16/20 12/16/20 History





[Asmanex]    


 


SUMAtriptan SUCCINATE [Imitrex] 100 mg PO BID PRN 12/16/20 12/16/20 History


 


Salmeterol 50 mcg [Serevent Diskus] 1 puff INHALATION ONCE 12/16/20 12/16/20 

History


 


Albuterol Sulfate [Proair Hfa] PRN 12/18/20  History








                                    Allergies











Allergy/AdvReac Type Severity Reaction Status Date / Time


 


No Known Allergies Allergy   Verified 12/18/20 13:36














 Physical Exam 


Vitals: 


                                   Vital Signs











  Temp Pulse Pulse Pulse Resp BP BP


 


 12/19/20 08:00  99.0 F  86    16   130/80


 


 12/19/20 02:00  98.4 F  92    20   116/76


 


 12/18/20 21:58     85   110/73 


 


 12/18/20 21:43     90   106/74 


 


 12/18/20 21:28     93   112/77 


 


 12/18/20 21:13     85   109/74 


 


 12/18/20 20:58     88   109/74 


 


 12/18/20 20:43     86   102/70 


 


 12/18/20 20:29     75   114/65 


 


 12/18/20 20:13     91   109/77 


 


 12/18/20 19:58     82   108/73 


 


 12/18/20 19:43  97.7 F    72  15  121/71 


 


 12/18/20 19:30      16  


 


 12/18/20 19:00     76  16  123/64 


 


 12/18/20 18:45     80  16  146/74 


 


 12/18/20 18:30     60  16  128/62 


 


 12/18/20 18:15     69  16  136/88 


 


 12/18/20 18:04  97.9 F    62  14  154/64 


 


 12/18/20 13:28  97.8 F   83   16  168/74 














  Pulse Ox


 


 12/19/20 08:00  90 L


 


 12/19/20 02:00  93 L


 


 12/18/20 21:58  93 L


 


 12/18/20 21:43  92 L


 


 12/18/20 21:28  91 L


 


 12/18/20 21:13  91 L


 


 12/18/20 20:58  91 L


 


 12/18/20 20:43  94 L


 


 12/18/20 20:29  94 L


 


 12/18/20 20:13  90 L


 


 12/18/20 19:58 


 


 12/18/20 19:43  93 L


 


 12/18/20 19:30 


 


 12/18/20 19:00  96


 


 12/18/20 18:45  96


 


 12/18/20 18:30  100


 


 12/18/20 18:15  100


 


 12/18/20 18:04  100


 


 12/18/20 13:28  99








                                Intake and Output











 12/18/20 12/19/20 12/19/20





 22:59 06:59 14:59


 


Intake Total 2500 100 


 


Output Total 750 660 


 


Balance 1750 -560 


 


Intake:   


 


  IV 2500  


 


  Oral  100 


 


Output:   


 


  Drainage  60 


 


    Abdomen  60 


 


  Urine 250 600 


 


    Uretheral ([place])  200 


 


  Estimated Blood Loss 500  


 


Other:   


 


  Voiding Method Indwelling Catheter  


 


  Weight 77 kg  








General Appearance: Alert, cooperative, no distress, appears stated age.


Neck HEENT: Supple, no lymphadenopathy, no thyroid enlargement, no carotid 

bruits.


Lungs: Clear to auscultation without crackles or wheezes no rhonchi, no 

deformity.


Chest Wall: Chest wall normal expansion with deep inspiration no tenderness and 

no deformity was found on exam, no costochondral pain or discomfort.


Heart: Regular rate and rhythm, S1, S2 normal, no murmur, rub or gallop.


Back: Symmetric, no curvature, ROM normal, no CVA tenderness.


Abdomen: Incision has lower abdominal surgical site with no sign of bleeding, 

right side has slight drainage tube and there is an incision smaller on the left

side as well with no bleed.


Extremities: Extremities normal, atraumatic, no cyanosis or edema.


Pulses: 2+ and symmetric.


Skin: Skin color, texture, tugor normal, no rashes or lesions.


Neurologic: Alert oriented x3 cranial nerves II through XII intact, no motor 

deficit, no abnormal balance or gait.








- Labs


CBC & Chem 7: 


                                 12/19/20 06:26





                                 12/19/20 06:26


Labs: 


                  Abnormal Lab Results - Last 24 Hours (Table)











  12/19/20 12/19/20 Range/Units





  06:26 06:26 


 


WBC  13.3 H   (3.8-10.6)  k/uL


 


RBC  3.23 L   (3.80-5.40)  m/uL


 


Hgb  10.1 L   (11.4-16.0)  gm/dL


 


Hct  29.7 L   (34.0-46.0)  %


 


Neutrophils #  11.6 H   (1.3-7.7)  k/uL


 


Glucose   134 H  ()  mg/dL


 


Calcium   8.4 L  (8.7-10.3)  mg/dL














Assessment and Plan


Assessment: 





1 day 2 post sigmoid resection: Patient is doing well no nausea vomiting no NG 

tube at this point pain is well control patient Vicodin,  patient is very stable

hemodynamically 





2 severe diverticulitis with recurrent episode and found of diverticular 

structure post surgery doing well.





3 post acute diverticulitis with rupture and abdominal abscess post IV 

antibiotic for several weeks has done very well so far.





4 asthma: Patient will go back on her Serevent and albuterol inhaler.





5 hypothyroidism: Resume levothyroxine at 25 g daily.





6 history of migraine: With no flareup at this point continue Imitrex as needed.





7 DVT prophylaxis: Continue patient on heparin 5000 units subcu every 8 hours.





8 GI prophylaxis: Patient remain on pantoprazole 40 mg IV daily.





Will increase physical activity continue local care for the incision continue 

pain management patient probably will be able to be discharged sometimes on 

Tuesday.

## 2020-12-21 NOTE — P.PN
Subjective


Progress Note Date: 12/21/20


Principal diagnosis: 


Diverticular stricture








The patient is postop day 3 sigmoid resection for diverticular stricture.  She's

tolerating clear liquids until melanotic taking very much.  She is passing 

flatus.  Denies nausea or vomiting.  Not doing very well with her incentive 

spirometry.  She is getting up to the chair and using the restroom.  Complaining

of some swelling in her hands





Objective





- Vital Signs


Vital signs: 


                                   Vital Signs











Temp  99.2 F   12/21/20 00:53


 


Pulse  90   12/21/20 00:53


 


Resp  18   12/21/20 00:53


 


BP  151/74   12/21/20 00:53


 


Pulse Ox  98   12/21/20 00:53








                                 Intake & Output











 12/20/20 12/21/20 12/21/20





 18:59 06:59 18:59


 


Output Total 1201 20 


 


Balance -1201 -20 


 


Output:   


 


  Drainage  20 


 


    Abdomen  20 


 


  Urine 1201  


 


    Uretheral ([place]) 1200  


 


Other:   


 


  Voiding Method Indwelling Catheter Toilet 


 


  # Voids 1 3 














- Constitutional


General appearance: Present: cooperative, no acute distress





- Respiratory


Respiratory: bilateral: CTA, diminished (Mildly at the bases)





- Cardiovascular


Rhythm: regular





- Gastrointestinal


General gastrointestinal: Present: normal bowel sounds, soft


Localized gastrointestinal: surgical scar: diffuse (Incisions are intact, clean 

and dry)





- Integumentary


Integumentary Comment(s): 





Mild swelling in her hands, no significant pitting edema in the lower 

extremities





- Labs


CBC & Chem 7: 


                                 12/19/20 06:26





                                 12/19/20 06:26





Assessment and Plan


(1) Diverticular stricture


Current Visit: Yes   Status: Acute   Code(s): K56.699 - OTHER INTESTNL OBST UNSP

AS TO PARTIAL VERSUS COMPLETE OBST   SNOMED Code(s): 89673119


   





(2) Asthma


Current Visit: Yes   Status: Acute   Code(s): J45.909 - UNSPECIFIED ASTHMA, 

UNCOMPLICATED   SNOMED Code(s): 833889557


   


Plan: 


Increase diet and activity as tolerated.  Continue to encourage her to use 

incentive spirometry.  Encourage ambulation in the oliver.  Progressing well.

## 2020-12-22 VITALS
SYSTOLIC BLOOD PRESSURE: 153 MMHG | RESPIRATION RATE: 19 BRPM | TEMPERATURE: 98.4 F | DIASTOLIC BLOOD PRESSURE: 84 MMHG | HEART RATE: 93 BPM

## 2020-12-22 LAB
ALBUMIN SERPL-MCNC: 3.4 G/DL (ref 3.8–4.9)
ALBUMIN/GLOB SERPL: 1.7 G/DL (ref 1.6–3.17)
ALP SERPL-CCNC: 63 U/L (ref 41–126)
ALT SERPL-CCNC: 26 U/L (ref 8–44)
ANION GAP SERPL CALC-SCNC: 12 MMOL/L (ref 4–12)
AST SERPL-CCNC: 33 U/L (ref 13–35)
BUN SERPL-SCNC: 12 MG/DL (ref 9–27)
BUN/CREAT SERPL: 17.14 RATIO (ref 12–20)
CALCIUM SPEC-MCNC: 8.3 MG/DL (ref 8.7–10.3)
CHLORIDE SERPL-SCNC: 106 MMOL/L (ref 96–109)
CO2 SERPL-SCNC: 23 MMOL/L (ref 21.6–31.8)
ERYTHROCYTE [DISTWIDTH] IN BLOOD BY AUTOMATED COUNT: 2.74 M/UL (ref 3.8–5.4)
ERYTHROCYTE [DISTWIDTH] IN BLOOD: 12.5 % (ref 11.5–15.5)
GLOBULIN SER CALC-MCNC: 2 G/DL (ref 1.6–3.3)
GLUCOSE SERPL-MCNC: 116 MG/DL (ref 70–110)
HCT VFR BLD AUTO: 25.8 % (ref 34–46)
HGB BLD-MCNC: 8.5 GM/DL (ref 11.4–16)
MCH RBC QN AUTO: 30.9 PG (ref 25–35)
MCHC RBC AUTO-ENTMCNC: 32.8 G/DL (ref 31–37)
MCV RBC AUTO: 94.2 FL (ref 80–100)
PLATELET # BLD AUTO: 380 K/UL (ref 150–450)
POTASSIUM SERPL-SCNC: 3.9 MMOL/L (ref 3.5–5.5)
PROT SERPL-MCNC: 5.4 G/DL (ref 6.2–8.2)
SODIUM SERPL-SCNC: 141 MMOL/L (ref 135–145)
WBC # BLD AUTO: 11.3 K/UL (ref 3.8–10.6)

## 2020-12-22 RX ADMIN — PANTOPRAZOLE SODIUM SCH MG: 40 INJECTION, POWDER, FOR SOLUTION INTRAVENOUS at 08:03

## 2020-12-22 RX ADMIN — POTASSIUM CHLORIDE SCH: 14.9 INJECTION, SOLUTION INTRAVENOUS at 04:12

## 2020-12-22 RX ADMIN — DEXTROSE MONOHYDRATE, SODIUM CHLORIDE, AND POTASSIUM CHLORIDE SCH: 50; 4.5; 1.49 INJECTION, SOLUTION INTRAVENOUS at 04:12

## 2020-12-22 RX ADMIN — HEPARIN SODIUM SCH UNIT: 5000 INJECTION, SOLUTION INTRAVENOUS; SUBCUTANEOUS at 08:03

## 2020-12-22 RX ADMIN — FLUTICASONE PROPIONATE SCH PUFF: 110 AEROSOL, METERED RESPIRATORY (INHALATION) at 09:08

## 2020-12-22 RX ADMIN — LEVOTHYROXINE SODIUM SCH MCG: 25 TABLET ORAL at 05:50

## 2020-12-22 NOTE — P.PN
Subjective





64-year-old female with past medical history of asthma, hypothyroidism, migraine

and recurrent diverticulitis who developed to have significant pain and 

discomfort with sign and symptom of diverticulitis was treated and watch she was

seen Dr. Prince back in August colonoscopy was done apparently showed severe 

diverticulitis patient ended up being sent for CT of the abdomen and pelvis came

back with abscess from ruptured diverticuli.  Patient was sent to the point to 

[facility] and spent 5 days on IV antibiotics and sent home on oral anti

biotic for long time.  Patient was seen Dr. Prince for follow-up for to Dr. Akins 

continue to have sign and symptom of slight abdominal discomfort the left side 

patient ended up coming to yesterday for elective surgery which might start as a

robotic for partial resection of the sigmoid ended up having open resection of 

the sigmoid with partial removal of diverticular structure.  Patient done very 

well in recovery ended up being sent to the floor has been feeling much better 

today still nothing by mouth but no NG tube at this point.





12/20: Patient is doing very well pain is under control no bleeding from the 

surgical site patient is doing well she is still nothing by mouth but no NG tube

at this point patient still better rested at this point which will increase 

physical therapy and titrate activity slightly.





12/21: Patient evaluated this morning, she is doing well, states pain is under c

ontrol.  Noted to have slight bleeding from the lower abdominal surgical site on

the left side.  Patient is tolerating clear liquids, reports she is passing some

flatus.  She denies any nausea or vomiting.  Encouraged to use incentive 

spirometer and ambulation.





12/22: Patient evaluated this morning, resting in bed comfortably in no acute 

distress.  Patient reports she's having bowel movements and passing gas. 

Surgical site looks good without drainage or bleeding today.  She denies any 

pain she is tolerating solid foods without nausea or vomiting.  Incentive 

spirometer at bedside, encouraged its use.  Also encouraged ambulation in the 

halls, she is getting up to the bedside chair and also ambulating to the 

restroom.  Hemoglobin did drop from 10.1-8.5 today, WBC 11.3.  Vital signs are 

stable she is afebrile temperature 98.8, pulse 87, respiratory 21, blood 

pressure 146/78, 96% on room air.





Objective





- Vital Signs


Vital signs: 


                                   Vital Signs











Temp  98.8 F   12/22/20 08:00


 


Pulse  87   12/22/20 08:10


 


Resp  21   12/22/20 08:10


 


BP  146/78   12/22/20 08:00


 


Pulse Ox  96   12/22/20 08:00








                                 Intake & Output











 12/21/20 12/22/20 12/22/20





 18:59 06:59 18:59


 


Other:   


 


  Voiding Method  Toilet Toilet


 


  # Voids 2  


 


  # Bowel Movements  1 














- Exam





General Appearance: Alert, cooperative, no distress


Neck HEENT: Supple, no lymphadenopathy, no thyroid enlargement, no carotid 

bruits.


Lungs: Clear to auscultation without crackles or wheezes no rhonchi, no deformi

ty.


Chest Wall: Chest wall normal expansion with deep inspiration no tenderness and 

no deformity was found on exam, no costochondral pain or discomfort.


Heart: Regular rate and rhythm, S1, S2 normal, no murmur, rub or gallop.


Back: Symmetric, no curvature, ROM normal, no CVA tenderness.


Abdomen: Incision has lower abdominal surgical site looks good 


Extremities: Extremities normal, atraumatic, no cyanosis or edema


Pulses: 2+ and symmetric.


Skin: Skin color, texture, tugor normal, no rashes or lesions 


Neurologic: Alert oriented x3 cranial nerves II through XII intact, no motor 

deficit, no abnormal balance or gait.








- Labs


CBC & Chem 7: 


                                 12/22/20 08:18





                                 12/19/20 06:26


Labs: 


                  Abnormal Lab Results - Last 24 Hours (Table)











  12/22/20 Range/Units





  08:18 


 


WBC  11.3 H  (3.8-10.6)  k/uL


 


RBC  2.74 L  (3.80-5.40)  m/uL


 


Hgb  8.5 L D  (11.4-16.0)  gm/dL


 


Hct  25.8 L  (34.0-46.0)  %














Assessment and Plan


Plan: 


1 day 3 post sigmoid resection: Patient is doing well no nausea vomiting, no NG 

tube at this point, pain is well controlled, patient is very stable hemodynam

ically 





2 severe diverticulitis with recurrent episode and found of diverticular 

structure post surgery doing well.





3 post acute diverticulitis with rupture and abdominal abscess post IV 

antibiotic for several weeks has done very well so far.





4 asthma: Patient will go back on her Serevent and albuterol inhaler.





5 hypothyroidism: Resume levothyroxine at 25 g daily.





6 history of migraine: With no flareup at this point continue Imitrex as needed.





7 DVT prophylaxis: Continue patient on heparin 5000 units subcu every 8 hours.





8 GI prophylaxis: Patient remain on pantoprazole 40 mg IV daily.





Will increase physical activity continue local care for the incision continue 

pain management patient probably will be able to be discharged sometimes on 

Tuesday.








The above impression and plan of care have been discussed and directed by 

signing physician. Adela Shafer nurse practitioner acting as scribe for 

signing physician.

## 2020-12-22 NOTE — P.DS
Providers


Date of admission: 


12/18/20 11:53





Expected date of discharge: 12/22/20


Attending physician: 


Brittney Akins





Consults: 





                                        





12/18/20 18:01


Consult Physician Routine 


   Consulting Provider: Hernan Iyer


   Consult Reason/Comments: medical management


   Do you want consulting provider notified?: Yes, Notify in am











Primary care physician: 


Stated None








- Discharge Diagnosis(es)


(1) Diverticular stricture


Current Visit: Yes   Status: Acute   





(2) Asthma


Current Visit: Yes   Status: Acute   


Hospital Course: 





The patient presented with a diverticular stricture.  She was taken to the OR 

where attempt was made at a robotic resection.  There was significant 

inflammation and adhesions to the uterus, tubes, pelvic sidewall.  She required 

open resection.  She was slowly increased on her diet and activity.  By 12-22 

she was tolerating a diet, bowels were moving.  Mild pain which was controlled 

without narcotic pain medicine.  [place] to be stable for discharge


Patient Condition at Discharge: Good





Plan - Discharge Summary


Discharge Rx Participant: Yes


New Discharge Prescriptions: 


No Action


   Salmeterol 50 mcg [Serevent Diskus] 1 puff INHALATION ONCE


   Mometasone Inhalr 220 Mcg/Puff [Asmanex] 1 puff INHALATION DAILY


   Levothyroxine Sodium [Synthroid] 25 mcg PO DAILY


   SUMAtriptan SUCCINATE [Imitrex] 100 mg PO BID PRN


     PRN Reason: migranes


   Albuterol Sulfate [Proair Hfa] PRN


     PRN Reason: Dyspnea


Discharge Medication List





Levothyroxine Sodium [Synthroid] 25 mcg PO DAILY 12/16/20 [History]


Mometasone Inhalr 220 Mcg/Puff [Asmanex] 1 puff INHALATION DAILY 12/16/20 

[History]


SUMAtriptan SUCCINATE [Imitrex] 100 mg PO BID PRN 12/16/20 [History]


Salmeterol 50 mcg [Serevent Diskus] 1 puff INHALATION ONCE 12/16/20 [History]


Albuterol Sulfate [Proair Hfa] PRN 12/18/20 [History]








Follow up Appointment(s)/Referral(s): 


Brittney Akins DO [Doctor of Osteopathic Medicine] - 12/31/20 9:45 am


Patient Instructions/Handouts:  Bowel Resection (DC)


Activity/Diet/Wound Care/Special Instructions: 


You may shower.  Light dressing to the incision.  No tub bath for 1 week.  Easy 

to digest food for 1-2 weeks.  Tylenol or Motrin as needed for pain.  Call if 

questions or concerns


Discharge Disposition: HOME SELF-CARE